# Patient Record
Sex: FEMALE | Race: WHITE | NOT HISPANIC OR LATINO | Employment: OTHER | URBAN - NONMETROPOLITAN AREA
[De-identification: names, ages, dates, MRNs, and addresses within clinical notes are randomized per-mention and may not be internally consistent; named-entity substitution may affect disease eponyms.]

---

## 2022-03-10 ENCOUNTER — TELEPHONE (OUTPATIENT)
Dept: CARDIOLOGY | Facility: CLINIC | Age: 57
End: 2022-03-10

## 2022-05-31 ENCOUNTER — OFFICE VISIT (OUTPATIENT)
Dept: FAMILY MEDICINE CLINIC | Facility: CLINIC | Age: 57
End: 2022-05-31

## 2022-05-31 VITALS
SYSTOLIC BLOOD PRESSURE: 132 MMHG | HEIGHT: 64 IN | OXYGEN SATURATION: 97 % | HEART RATE: 75 BPM | WEIGHT: 186 LBS | BODY MASS INDEX: 31.76 KG/M2 | DIASTOLIC BLOOD PRESSURE: 90 MMHG

## 2022-05-31 DIAGNOSIS — M25.552 CHRONIC LEFT HIP PAIN: Primary | ICD-10-CM

## 2022-05-31 DIAGNOSIS — R31.29 OTHER MICROSCOPIC HEMATURIA: ICD-10-CM

## 2022-05-31 DIAGNOSIS — R22.1 LOCALIZED SWELLING, MASS AND LUMP, NECK: ICD-10-CM

## 2022-05-31 DIAGNOSIS — R35.0 URINARY FREQUENCY: ICD-10-CM

## 2022-05-31 DIAGNOSIS — Z12.31 SCREENING MAMMOGRAM FOR BREAST CANCER: ICD-10-CM

## 2022-05-31 DIAGNOSIS — I10 PRIMARY HYPERTENSION: ICD-10-CM

## 2022-05-31 DIAGNOSIS — E55.9 VITAMIN D DEFICIENCY: ICD-10-CM

## 2022-05-31 DIAGNOSIS — G89.29 CHRONIC LEFT HIP PAIN: Primary | ICD-10-CM

## 2022-05-31 LAB
BILIRUB BLD-MCNC: NEGATIVE MG/DL
CLARITY, POC: CLEAR
COLOR UR: YELLOW
EXPIRATION DATE: ABNORMAL
GLUCOSE UR STRIP-MCNC: NEGATIVE MG/DL
KETONES UR QL: NEGATIVE
LEUKOCYTE EST, POC: NEGATIVE
Lab: ABNORMAL
NITRITE UR-MCNC: NEGATIVE MG/ML
PH UR: 5.5 [PH] (ref 5–8)
PROT UR STRIP-MCNC: NEGATIVE MG/DL
RBC # UR STRIP: ABNORMAL /UL
SP GR UR: 1.03 (ref 1–1.03)
UROBILINOGEN UR QL: NORMAL

## 2022-05-31 PROCEDURE — 96372 THER/PROPH/DIAG INJ SC/IM: CPT | Performed by: PHYSICIAN ASSISTANT

## 2022-05-31 PROCEDURE — 99214 OFFICE O/P EST MOD 30 MIN: CPT | Performed by: PHYSICIAN ASSISTANT

## 2022-05-31 PROCEDURE — 81003 URINALYSIS AUTO W/O SCOPE: CPT | Performed by: PHYSICIAN ASSISTANT

## 2022-05-31 RX ORDER — CHOLECALCIFEROL (VITAMIN D3) 125 MCG
TABLET ORAL
Qty: 90 TABLET | Refills: 1 | Status: SHIPPED | OUTPATIENT
Start: 2022-05-31 | End: 2023-02-08

## 2022-05-31 RX ORDER — TRIAMCINOLONE ACETONIDE 40 MG/ML
80 INJECTION, SUSPENSION INTRA-ARTICULAR; INTRAMUSCULAR ONCE
Status: COMPLETED | OUTPATIENT
Start: 2022-05-31 | End: 2022-05-31

## 2022-05-31 RX ORDER — CHOLECALCIFEROL (VITAMIN D3) 125 MCG
TABLET ORAL
COMMUNITY
End: 2022-05-31 | Stop reason: SDUPTHER

## 2022-05-31 RX ORDER — CIPROFLOXACIN 500 MG/1
500 TABLET, FILM COATED ORAL 2 TIMES DAILY
Qty: 10 TABLET | Refills: 0 | Status: SHIPPED | OUTPATIENT
Start: 2022-05-31 | End: 2023-02-08

## 2022-05-31 RX ADMIN — TRIAMCINOLONE ACETONIDE 80 MG: 40 INJECTION, SUSPENSION INTRA-ARTICULAR; INTRAMUSCULAR at 14:46

## 2023-02-06 NOTE — PROGRESS NOTES
[facility] Cardiology  [address], [address]  [place], [place], [address]    [phone]  [email or web address]           OUTPATIENT CLINIC CONSULTATION NOTE    Patient care team:  Patient Care Team:  Agustin Raygoza DO as PCP - General (Family Medicine)  Katrina Pagan MD as Consulting Physician (Cardiology)    Requesting Provider and Reason for consultation: The patient is being seen today at the request of Chary Mascorro MD for palpitations.     Subjective:   Chief complaint:   Chief Complaint   Patient presents with   • Shortness of Breath   • Rapid Heart Rate   • Dizziness       HPI:    Georgia Peña is a 57 y.o. female.  Cardiac focused problem list:  1. Palpitations   a. Holter monitor 9/2022:  Monitored for 38 hours. Average heart rate 98 bpm. Overall unremarkable study.   b. Stress test spring 2022 with Dr. Clement's office, data deficient.   c. Echocardiogram Spring 2022 with Dr. Clement's office, data deficient.   2. Hypertension   3. Thyroid disease  4. Raynaud's disease  5. Sleep apnea   6. Vitamin D deficiency     Patient presents today for consultation. Patient had a syncopal episode 2/2022.  Underwent evaluation with Dr. Clement.  Had a stress test and echocardiogram at that time that was reportedly normal.  Also wore a heart monitor at that time for 48 hours that was reportedly normal.  Notes she had an admission to ED from [facility] 9/2022 for stroke like symptoms.  Work-up for stroke at that time was negative.  She wore another heart monitor 9/2022 for approximately 38 hours that did not really reveal any significant arrhythmias.    Patient has a history of hypertension, thyroid nodules and Raynaud's syndrome..  She underwent sleep study approximately 2 months ago and has an appointment with sleep medicine tomorrow.  Also has been referred to endocrinology for thyroid disease with appointment tomorrow as well.    Patient denies tobacco, EtOH or  "drug use.  Does smoke 1 g of marijuana daily.  No significant family history of coronary artery disease.  Blood pressure at home averaging 140/100.    Review of Systems:  As noted above in the HPI    PFSH:  Patient Active Problem List   Diagnosis   • Chronic left hip pain   • Primary hypertension   • Urinary frequency   • Vitamin D deficiency   • Screening mammogram for breast cancer   • Localized swelling, mass and lump, neck   • Other microscopic hematuria         Current Outpatient Medications:   •  Cholecalciferol (Vitamin D3) 50 MCG (2000 UT) chewable tablet, Chew Daily., Disp: , Rfl:   •  L-ARGININE-500 PO, Take  by mouth Daily., Disp: , Rfl:   •  Magnesium 500 MG capsule, Take 1,000 mg by mouth Daily., Disp: , Rfl:   •  promethazine (PHENERGAN) 25 MG tablet, Take 25 mg by mouth As Needed for Nausea or Vomiting., Disp: , Rfl:   •  rizatriptan MLT (MAXALT-MLT) 10 MG disintegrating tablet, As Needed., Disp: , Rfl:   •  Selenium 100 MCG tablet, Take  by mouth Daily., Disp: , Rfl:   •  Vitamin A 3 MG (04178 UT) capsule, Take 10,000 Units by mouth Daily., Disp: , Rfl:   •  vitamin B-12 (CYANOCOBALAMIN) 1000 MCG tablet, Take 10,000 mcg by mouth Daily., Disp: , Rfl:     No Known Allergies    Social History     Socioeconomic History   • Marital status:    Tobacco Use   • Smoking status: Never   • Smokeless tobacco: Never   Vaping Use   • Vaping Use: Never used   Substance and Sexual Activity   • Alcohol use: Defer   • Drug use: Yes     Frequency: 7.0 times per week     Types: Marijuana     Comment: 1 gram daily   • Sexual activity: Defer     Family History   Problem Relation Age of Onset   • Thyroid disease Mother    • Alzheimer's disease Mother          Objective:   Physical Exam:  Pulse 67   Ht 162.6 cm (64\")   Wt 85.8 kg (189 lb 3.2 oz)   SpO2 99%   BMI 32.48 kg/m²   CONSTITUTIONAL: Well-nourished. In no acute distress.   SKIN: Warm and dry. No rashes noted  HEENT: Head is normocephalic and atraumatic. "   NECK: Supple without masses or thyromegaly.   LUNGS: Normal effort. Clear to auscultation bilaterally without wheezing, rhonchi, or rales noted.   CARDIOVASCULAR: Regular rate and rhythm with a normal S1 and S2. There is no murmur. Carotid upstrokes are 2+ and symmetrical without bruits.  2+ radial pulses bilaterally.  There is no peripheral edema.   ABDOMEN: Normal bowel sounds.  Nondistended.  MUSCULOSKELETAL:  No digital cyanosis  NEUROLOGICAL: Nonfocal.  PSYCHIATRIC: Alert, orientated, appropriate affect and mood        Labs:  Labs reviewed by myself    No results found for: CHOL  No results found for: TRIG  No results found for: HDL  No results found for: LDL  No components found for: LDLDIRECTC    Outside facility labs, 9/18/2022:  · CBC:  WBC 9.93, Hgb 16.5, Hct 47.9, plt 266  · CMP:  BUN 14, creatinine 0.70, K 3.9, ALT 35, AST 28  · Thyroid panel:  TSH 1.67, free T4 1.76    Diagnostic Data:      ECG 12 Lead    Date/Time: 2/8/2023 2:08 PM  Performed by: Katrina Pagan MD  Authorized by: Katrina Pagan MD   Rhythm: sinus rhythm  Rate: normal  BPM: 67  Conduction: conduction normal  Other: no other findings          Holter monitor 9/18/2022:   · Patient monitored for 35 hours 8 minutes.  Average heart rate 98 bpm.    · Ventricular ectopy consisted of 16 beats, of which 15 were single PVCs, 1 single VE.    · There was 26 minutes 16 seconds of bradycardia with minimum heart rate 52 bpm.    · There was 17 hours and 35 minutes of tachycardia, fastest single episode of tachycardia lasting 9 minutes with maximum heart rate of 176 bpm.    · Supraventricular ectopy consisted of 43 beats, of which 24-hour late beats, 15 were single PACs, 5 or bigeminy.        Assessment and Plan:     Palpitations  -Patient with ongoing palpitations and presyncope.  -Recommend 14-day heart monitor to evaluate for arrhythmias.  -We will obtain records from Dr. Clement's office including echo and stress test  results.  -Await evaluation for possible sleep apnea as well as endocrine evaluation.    Primary hypertension   -Blood pressure elevated recently at home, reportedly averaging 140/100.  -Patient to monitor blood pressure at home and keep a diary.  Bring readings to follow up appointment.     - Return in about 4 weeks (around 3/8/2023) for Next scheduled follow up.    Scribed for Katrina Pagan MD by Lela Mendoza, JESSE. 2/8/2023  14:32 EST    I Katrina Pagan MD personally performed the services described in this documentation as scribed by the above individual in my presence, and it is both accurate and complete.    Katrina Pagan MD, FACC

## 2023-02-08 ENCOUNTER — OFFICE VISIT (OUTPATIENT)
Dept: CARDIOLOGY | Facility: CLINIC | Age: 58
End: 2023-02-08
Payer: COMMERCIAL

## 2023-02-08 VITALS — BODY MASS INDEX: 32.3 KG/M2 | OXYGEN SATURATION: 99 % | HEART RATE: 67 BPM | WEIGHT: 189.2 LBS | HEIGHT: 64 IN

## 2023-02-08 DIAGNOSIS — Z13.6 SCREENING FOR AAA (ABDOMINAL AORTIC ANEURYSM): ICD-10-CM

## 2023-02-08 DIAGNOSIS — R00.2 PALPITATIONS: Primary | ICD-10-CM

## 2023-02-08 DIAGNOSIS — I10 PRIMARY HYPERTENSION: ICD-10-CM

## 2023-02-08 PROCEDURE — 99204 OFFICE O/P NEW MOD 45 MIN: CPT | Performed by: INTERNAL MEDICINE

## 2023-02-08 PROCEDURE — 93000 ELECTROCARDIOGRAM COMPLETE: CPT | Performed by: INTERNAL MEDICINE

## 2023-02-08 RX ORDER — SELENIUM 100 MCG
TABLET ORAL DAILY
COMMUNITY

## 2023-02-08 RX ORDER — LANOLIN ALCOHOL/MO/W.PET/CERES
10000 CREAM (GRAM) TOPICAL DAILY
COMMUNITY
End: 2023-03-29

## 2023-02-08 RX ORDER — RIZATRIPTAN BENZOATE 10 MG/1
TABLET, ORALLY DISINTEGRATING ORAL AS NEEDED
COMMUNITY
Start: 2022-12-23

## 2023-02-08 RX ORDER — PROMETHAZINE HYDROCHLORIDE 25 MG/1
25 TABLET ORAL AS NEEDED
COMMUNITY

## 2023-02-08 RX ORDER — FOLIC ACID 0.8 MG
150 TABLET ORAL DAILY
COMMUNITY

## 2023-02-15 ENCOUNTER — TELEPHONE (OUTPATIENT)
Dept: CARDIOLOGY | Facility: CLINIC | Age: 58
End: 2023-02-15
Payer: COMMERCIAL

## 2023-02-15 NOTE — TELEPHONE ENCOUNTER
PT calls to report that this morning she woke up from a bed sleep with tachycardia. She checked her BP and it was 171/112 's. She thinks that she Lisinopril is causing issues with her heart rate and is requesting an alternate medications.     She is wearing a 2  Week monitor so I can not see strips yet.       Upon discussing further with pt- she was prescribed lisinopril last year by her pcp. She saw her endocrinologist on Friday who instructed her to start lisinopril. I explained that we did not instruct her to start lisinopril. I read the instructions from her visit from Dr. Pagan last week- she will stop lisinopril. Keep a log and bring that log and her cuff to the appointment.

## 2023-02-24 ENCOUNTER — HOSPITAL ENCOUNTER (OUTPATIENT)
Dept: ULTRASOUND IMAGING | Facility: HOSPITAL | Age: 58
Discharge: HOME | End: 2023-02-24
Admitting: HOSPITALIST
Payer: COMMERCIAL

## 2023-02-24 DIAGNOSIS — Z13.6 SCREENING FOR AAA (ABDOMINAL AORTIC ANEURYSM): ICD-10-CM

## 2023-02-24 PROCEDURE — 76706 US ABDL AORTA SCREEN AAA: CPT

## 2023-02-27 ENCOUNTER — TELEPHONE (OUTPATIENT)
Dept: CARDIOLOGY | Facility: CLINIC | Age: 58
End: 2023-02-27
Payer: COMMERCIAL

## 2023-02-27 NOTE — TELEPHONE ENCOUNTER
Patient contacted to review ultrasound results. All questions answered at this time, pt verbalizes understanding.

## 2023-02-27 NOTE — TELEPHONE ENCOUNTER
----- Message from JESSE Mendiola sent at 2/27/2023 12:51 PM EST -----  Can you let this patient know that her ultrasound was normal, no evidence of abdominal aortic aneurysm. Thank you.

## 2023-03-03 RX ORDER — DILTIAZEM HYDROCHLORIDE 180 MG/1
180 CAPSULE, EXTENDED RELEASE ORAL DAILY
Qty: 30 CAPSULE | Refills: 1 | Status: SHIPPED | OUTPATIENT
Start: 2023-03-03 | End: 2023-03-29

## 2023-03-03 NOTE — TELEPHONE ENCOUNTER
Per Dr. Pagan- add Diltiazem 180 mg daily for patients complaints of BP suddenly elevating randomly throughout the day.     Try to call pt but no answer and no vm to leave a msg.

## 2023-03-29 ENCOUNTER — OFFICE VISIT (OUTPATIENT)
Dept: CARDIOLOGY | Facility: CLINIC | Age: 58
End: 2023-03-29
Payer: COMMERCIAL

## 2023-03-29 VITALS
WEIGHT: 192 LBS | RESPIRATION RATE: 18 BRPM | SYSTOLIC BLOOD PRESSURE: 154 MMHG | HEIGHT: 64 IN | BODY MASS INDEX: 32.78 KG/M2 | HEART RATE: 72 BPM | OXYGEN SATURATION: 100 % | DIASTOLIC BLOOD PRESSURE: 98 MMHG

## 2023-03-29 DIAGNOSIS — R00.2 PALPITATIONS: Primary | ICD-10-CM

## 2023-03-29 DIAGNOSIS — I10 PRIMARY HYPERTENSION: ICD-10-CM

## 2023-03-29 PROCEDURE — 99214 OFFICE O/P EST MOD 30 MIN: CPT | Performed by: NURSE PRACTITIONER

## 2023-03-29 RX ORDER — LOSARTAN POTASSIUM 25 MG/1
25 TABLET ORAL DAILY
Qty: 30 TABLET | Refills: 1 | Status: SHIPPED | OUTPATIENT
Start: 2023-03-29

## 2023-03-29 RX ORDER — VITAMIN B COMPLEX
CAPSULE ORAL DAILY
COMMUNITY

## 2023-03-29 NOTE — PROGRESS NOTES
[facility] Cardiology    Patient ID: Georgia Peña is a 57 y.o. female.  : 1965   Contact: [phone]    Encounter date: 2023    PCP: Bravo Nash MD      Chief complaint:   Chief Complaint   Patient presents with   • Palpitations       Problem List:  1. Palpitations   a. Holter monitor 2022:  Monitored for 38 hours. Average heart rate 98 bpm. Overall unremarkable study.   b. Stress test spring 2022 with Dr. Clement's office, data deficient.   c. Echocardiogram Spring 2022 with Dr. Clement's office, data deficient.   d. Event monitor, 2023: Sinus rhythm, sinus bradycardia, and sinus tachycardia. Rare PVCs and occasional PACs. Brief SVT. Pt events correlate to sinus rhythm and sinus tachycardia. Max rate 117 bpm.  2. Hypertension   3. Thyroid disease  4. Raynaud's disease  5. Sleep apnea   6. Vitamin D deficiency     Allergies   Allergen Reactions   • Diltiazem Palpitations   • Lisinopril Palpitations       Current Medications:    Current Outpatient Medications:   •  B Complex Vitamins (vitamin b complex) capsule capsule, Take  by mouth Daily., Disp: , Rfl:   •  Cholecalciferol (Vitamin D3) 50 MCG (2000 UT) chewable tablet, Chew 10,000 Units Daily., Disp: , Rfl:   •  L-ARGININE-500 PO, Take  by mouth Daily., Disp: , Rfl:   •  Magnesium 500 MG capsule, Take 150 mg by mouth Daily., Disp: , Rfl:   •  promethazine (PHENERGAN) 25 MG tablet, Take 1 tablet by mouth As Needed for Nausea or Vomiting., Disp: , Rfl:   •  rizatriptan MLT (MAXALT-MLT) 10 MG disintegrating tablet, As Needed., Disp: , Rfl:   •  Selenium 100 MCG tablet, Take  by mouth Daily., Disp: , Rfl:   •  losartan (Cozaar) 25 MG tablet, Take 1 tablet by mouth Daily., Disp: 30 tablet, Rfl: 1  •  Vitamin A 3 MG (76311 UT) capsule, Take 10,000 Units by mouth Daily. (Patient not taking: Reported on 3/29/2023), Disp: , Rfl:     HPI    Georiga Peña is a 57 y.o. female who presents today for 1-month follow  "up of palpitations, hypertension and cardiac risk factors.  At her last visit a 2-week monitor was ordered.  Results did not suggest any arrhythmia such as atrial fibrillation or flutter.  She did have 10 episodes of brief SVT lasting no more than 10 beats.  She had rare PACs/PVCs.  The patient is still hypertensive at 154/98.  She is not on any blood pressure medications as she has a intolerance to multiple medications.  She has tried diltiazem but it caused her palpitations to worsen and made her \"shake all over\".  She had the same effects after trying lisinopril, Procardia and hydrochlorothiazide.  She has never tried a angiotensin receptor blocker.  She reports having a history of Raynaud's but was unable to tolerate medication for it.  When her feet gets cold and wiped she will get in a hot tub.  She has been monitoring her blood pressures at home and carries a log.  Her blood pressures are not ago and elevated.  There has been several occasions that her blood pressure cuff is said \"irregular heartbeat\".  She has been tested for sleep apnea and is awaiting the results.  She reports recently taking a dose of her 's Flexeril which made her \"feel better\" and made her blood pressure better.  She denies chest pain, dyspnea, orthopnea, or syncope      The following portions of the patient's history were reviewed and updated as appropriate: allergies, current medications and problem list.    Pertinent positives as listed in the HPI.  All other systems reviewed are negative.         Vitals:    03/29/23 1126   BP: 154/98   Pulse: 72   Resp: 18   SpO2: 100%   Weight: 87.1 kg (192 lb)   Height: 162.6 cm (64\")       Physical Exam:  General: Alert and oriented.  Neck: Jugular venous pressure is within normal limits. Carotids have normal upstrokes without bruits.   Cardiovascular: Heart has a nondisplaced focal PMI. Regular rate and rhythm. No murmur, gallop or rub.  Lungs: Clear, no rales or wheezes. Equal expansion " is noted.   Extremities: Show no edema.  Skin: Warm and dry.  Neurologic: Nonfocal.     Diagnostic Data (reviewed with patient):    There are no recent laboratory studies available for review.    Advance Care Planning   ACP discussion was held with the patient during this visit. Patient does not have an advance directive, information provided.       Procedures      Assessment:    ICD-10-CM ICD-9-CM   1. Palpitations  R00.2 785.1   2. Primary hypertension  I10 401.9         Plan:  1. Patient to contact us once results of sleep apnea test is known.  If she has obstructive sleep apnea treating it with CPAP will improve her blood pressures  2. She is agreeable to trying losartan 25 mg daily  3. If patient's blood pressures do not improve would recommend renal artery duplex to ensure no renal artery stenosis  4. Anxiety could be playing a role would be beneficial to discuss with Dr. Nash may need treatment of anxiety.  5. Continue all other current medications.  6. F/up in 6 months, sooner if needed.      JESSE Morelos

## 2023-05-04 ENCOUNTER — TELEPHONE (OUTPATIENT)
Dept: CARDIOLOGY | Facility: CLINIC | Age: 58
End: 2023-05-04
Payer: COMMERCIAL

## 2023-05-04 NOTE — TELEPHONE ENCOUNTER
Returned call to pt but went straight to VM but VM has not been set up yet.     PT stating that she saw a genetic physician and they have suggested that she have a coronary calcium score done- that can be done without an order at [facility]. I will provide her with the contact info for them. That same genetic physician suggested that she increase Losartan due to elevated BP readings. She did not leave any readings on the VM.     Will need readings from pt and then further recommendations will be made

## 2023-05-05 NOTE — TELEPHONE ENCOUNTER
Was to reach today-     CRP 6.9   Gene for cardiac problems   She has been checking her BP @ 4am when she gets up to use the bathroom and is elevated- reports 150's/112- but states during the day her BP is 120's/80's. She does not have daily readings available.     She will check BP for the next 5-6 days and call back readings.     Provided the number for cardiac calcium score at [facility].

## 2023-05-26 ENCOUNTER — TELEPHONE (OUTPATIENT)
Dept: CARDIOLOGY | Facility: CLINIC | Age: 58
End: 2023-05-26
Payer: COMMERCIAL

## 2023-05-26 NOTE — TELEPHONE ENCOUNTER
TC from patient on voicemail.  She reports daily B/Ps averaging 150/104.  She has awakened the last two nights at 2AM with B/Ps of 174/111 and 184/116.  She reports her fingers and toes are white and her face is numb with both of these B/Ps.    TC to patient.  Unable to leave message - voice mailbox not set up.  Will attempt to call again this afternoon.